# Patient Record
Sex: FEMALE
[De-identification: names, ages, dates, MRNs, and addresses within clinical notes are randomized per-mention and may not be internally consistent; named-entity substitution may affect disease eponyms.]

---

## 2019-09-08 ENCOUNTER — HOSPITAL ENCOUNTER (EMERGENCY)
Dept: HOSPITAL 5 - ED | Age: 69
Discharge: HOME | End: 2019-09-08
Payer: MEDICARE

## 2019-09-08 DIAGNOSIS — I25.2: ICD-10-CM

## 2019-09-08 DIAGNOSIS — Z86.73: ICD-10-CM

## 2019-09-08 DIAGNOSIS — Z90.49: ICD-10-CM

## 2019-09-08 DIAGNOSIS — Z98.890: ICD-10-CM

## 2019-09-08 DIAGNOSIS — G89.29: Primary | ICD-10-CM

## 2019-09-08 DIAGNOSIS — J43.9: ICD-10-CM

## 2019-09-08 DIAGNOSIS — M19.90: ICD-10-CM

## 2019-09-08 DIAGNOSIS — M25.562: ICD-10-CM

## 2019-09-08 DIAGNOSIS — Z95.1: ICD-10-CM

## 2019-09-08 DIAGNOSIS — I25.10: ICD-10-CM

## 2019-09-08 DIAGNOSIS — I10: ICD-10-CM

## 2019-09-08 DIAGNOSIS — M25.561: ICD-10-CM

## 2019-09-08 DIAGNOSIS — Z87.891: ICD-10-CM

## 2019-09-08 DIAGNOSIS — Z79.899: ICD-10-CM

## 2019-09-08 PROCEDURE — 99283 EMERGENCY DEPT VISIT LOW MDM: CPT

## 2019-09-08 NOTE — EMERGENCY DEPARTMENT REPORT
ED Extremity Problem HPI





- General


Chief complaint: Extremity Problem,Nontraumatic


Stated complaint: GENERAL WEAKNESS


Time Seen by Provider: 09/08/19 17:28


Source: patient, EMS


Mode of arrival: Stretcher


Limitations: Physical Limitation





- History of Present Illness


Initial comments: 





69-year-old  female presents to the emergency room complaining of 

bilateral knee pain.  Patient reports that she has an extensive history of CVA, 

CAD osteoarthritis multiple falls.  Patient has not taken anything for pain 

management.  Patient reports that she is aware that she needs surgery on her 

knee but her surgeon does not feel that she is strong enough to have surgery.  

Patient denies any fever chills no falls within the last week. 


MD Complaint: extremity pain


-: year(s)


Location: bilateral lower extremity (knee)


History of Same: Yes


Quality: aching


Consistency: constant


Improves with: nothing


Worsens with: weight bearing, walking





- Related Data


                                  Previous Rx's











 Medication  Instructions  Recorded  Last Taken  Type


 


guaiFENesin DM [Robitussin Dm] 10 ml PO Q4H PRN #1 bottle 08/29/16 Unknown Rx


 


ALPRAZolam [Xanax TAB] 0.25 mg PO Q8H PRN #30 tablet 12/05/16 Unknown Rx


 


AtorvaSTATin [Lipitor] 20 mg PO HS #30 tablet 12/05/16 Unknown Rx


 


Clopidogrel [Plavix] 75 mg PO QDAY #30 tablet 12/05/16 Unknown Rx


 


Famotidine [Pepcid] 20 mg PO BID #60 tablet 12/05/16 Unknown Rx


 


HYDROcodone/APAP 7.5-325 [Norco 1 each PO Q6H PRN #30 tablet 12/05/16 Unknown Rx





7.5-325 mg TAB]    


 


Ipratropium/Albuter (Nf) 2 puff IH QID #1 inha 12/05/16 Unknown Rx





[Combivent Inhaler]    


 


Ipratropium/Albuterol Sulfate 1 ampul IH Q6HRT #120 ampul.neb 12/05/16 Unknown 

Rx





[DUONEB *Not for PRN Use*]    


 


Lidocaine [Lidoderm Patch] 1 each TP QDAY #30 adh..patch 12/05/16 Unknown Rx


 


Metoprolol [Lopressor TAB] 50 mg PO BID #60 tablet 12/05/16 Unknown Rx


 


rOPINIRole [Requip] 0.25 mg PO QPM #30 tablet 12/05/16 Unknown Rx


 


traMADol [Ultram 50 MG tab] 50 mg PO Q6HR PRN #12 tablet 09/08/19 Unknown Rx











                                    Allergies











Allergy/AdvReac Type Severity Reaction Status Date / Time


 


No Known Allergies Allergy   Verified 09/08/19 17:27














ED Review of Systems


ROS: 


Stated complaint: GENERAL WEAKNESS


Other details as noted in HPI





Comment: All other systems reviewed and negative


Constitutional: denies: chills, fever


Eyes: denies: eye pain, eye discharge, vision change


ENT: denies: ear pain, throat pain


Respiratory: denies: cough, shortness of breath, wheezing


Musculoskeletal: joint swelling, arthralgia





ED Past Medical Hx





- Past Medical History


Previous Medical History?: Yes


Hx Hypertension: Yes


Hx CVA: Yes (L sided paralysis, residual)


Hx Heart Attack/AMI: Yes


Hx Arthritis: Yes


Hx COPD: Yes


Additional medical history: EMPHESYMA.  CAD.  Multiple falls





- Surgical History


Past Surgical History?: Yes


Hx Coronary Stent: Yes (X 5)


Hx Pacemaker: No


Hx Cholecystectomy: Yes





- Social History


Smoking Status: Former Smoker


Substance Use Type: None





- Medications


Home Medications: 


                                Home Medications











 Medication  Instructions  Recorded  Confirmed  Last Taken  Type


 


guaiFENesin DM [Robitussin Dm] 10 ml PO Q4H PRN #1 bottle 08/29/16 11/19/16 

Unknown Rx


 


ALPRAZolam [Xanax TAB] 0.25 mg PO Q8H PRN #30 tablet 12/05/16  Unknown Rx


 


AtorvaSTATin [Lipitor] 20 mg PO HS #30 tablet 12/05/16  Unknown Rx


 


Clopidogrel [Plavix] 75 mg PO QDAY #30 tablet 12/05/16  Unknown Rx


 


Famotidine [Pepcid] 20 mg PO BID #60 tablet 12/05/16  Unknown Rx


 


HYDROcodone/APAP 7.5-325 [Norco 1 each PO Q6H PRN #30 tablet 12/05/16  Unknown 

Rx





7.5-325 mg TAB]     


 


Ipratropium/Albuter (Nf) 2 puff IH QID #1 inha 12/05/16  Unknown Rx





[Combivent Inhaler]     


 


Ipratropium/Albuterol Sulfate 1 ampul IH Q6HRT #120 ampul.neb 12/05/16  Unknown 

Rx





[DUONEB *Not for PRN Use*]     


 


Lidocaine [Lidoderm Patch] 1 each TP QDAY #30 adh..patch 12/05/16  Unknown Rx


 


Metoprolol [Lopressor TAB] 50 mg PO BID #60 tablet 12/05/16  Unknown Rx


 


rOPINIRole [Requip] 0.25 mg PO QPM #30 tablet 12/05/16  Unknown Rx


 


traMADol [Ultram 50 MG tab] 50 mg PO Q6HR PRN #12 tablet 09/08/19  Unknown Rx














ED Physical Exam





- General


Limitations: Physical Limitation


General appearance: alert, in no apparent distress





- Head


Head exam: Present: atraumatic, normocephalic





- Eye


Eye exam: Present: normal appearance





- ENT


ENT exam: Present: mucous membranes moist





- Respiratory


Respiratory exam: Present: normal lung sounds bilaterally.  Absent: respiratory 

distress





- Cardiovascular


Cardiovascular Exam: Present: regular rate, normal rhythm.  Absent: systolic 

murmur, diastolic murmur, rubs, gallop





- GI/Abdominal


GI/Abdominal exam: Present: soft, normal bowel sounds.  Absent: distended, 

tenderness





- Expanded Lower Extremity Exam


  ** Left


Upper Leg exam: Present: normal inspection, full ROM


Knee exam: Present: tenderness, swelling


Neuro vascular tendon exam: Present: no vascular compromise





  ** Right


Hip exam: Present: normal inspection, full ROM


Upper Leg exam: Present: normal inspection.  Absent: full ROM, tenderness


Knee exam: Present: full ROM.  Absent: tenderness, swelling, abrasion, 

laceration, ecchymosis, deformity, crepidus, dislocation, erythema, effusion


Lower Leg exam: Present: full ROM.  Absent: tenderness, swelling


Ankle exam: Present: normal inspection, full ROM.  Absent: tenderness, swelling





- Neurological Exam


Neurological exam: Present: alert, oriented X3





- Psychiatric


Psychiatric exam: Present: depressed





- Skin


Skin exam: Present: warm, dry, intact, normal color.  Absent: rash





ED Course


                                   Vital Signs











  09/08/19





  17:20


 


Temperature 98.3 F


 


Pulse Rate 78


 


Respiratory 16





Rate 


 


Blood Pressure 166/82


 


O2 Sat by Pulse 95





Oximetry 














ED Medical Decision Making





- Medical Decision Making





69-year-old  female presents to the emergency room complaining of 

bilateral knee pain.  Patient reports that she has an extensive history of CVA, 

CAD osteoarthritis multiple falls.  Patient has not taken anything for pain 

management.  Patient reports that she is aware that she needs surgery on her 

knee but her surgeon does not feel that she is strong enough to have surgery.  

Patient denies any fever chills no falls within the last week. 





Patient will given tramadol  for pain management. 


Critical care attestation.: 


If time is entered above; I have spent that time in minutes in the direct care 

of this critically ill patient, excluding procedure time.








ED Disposition


Clinical Impression: 


Chronic knee pain


Qualifiers:


 Laterality: bilateral Qualified Code(s): M25.561 - Pain in right knee; M25.562 

- Pain in left knee; G89.29 - Other chronic pain





Disposition: DC-01 TO HOME OR SELFCARE


Is pt being admited?: No


Does the pt Need Aspirin: No


Condition: Stable


Instructions:  Arthralgia (ED)


Additional Instructions: 


Take pain medication as needed.  Please follow-up with her primary care provider

to discuss pain management.


Prescriptions: 


traMADol [Ultram 50 MG tab] 50 mg PO Q6HR PRN #12 tablet


 PRN Reason: Pain


Referrals: 


Your,Provider [Other] - 3-5 Days

## 2019-09-09 VITALS — SYSTOLIC BLOOD PRESSURE: 159 MMHG | DIASTOLIC BLOOD PRESSURE: 73 MMHG

## 2020-05-25 ENCOUNTER — HOSPITAL ENCOUNTER (EMERGENCY)
Dept: HOSPITAL 5 - ED | Age: 70
LOS: 1 days | Discharge: LEFT BEFORE DISCHARGE | End: 2020-05-26
Payer: MEDICARE

## 2020-05-25 DIAGNOSIS — Z95.818: ICD-10-CM

## 2020-05-25 DIAGNOSIS — J44.9: ICD-10-CM

## 2020-05-25 DIAGNOSIS — G89.29: Primary | ICD-10-CM

## 2020-05-25 DIAGNOSIS — Z76.0: ICD-10-CM

## 2020-05-25 DIAGNOSIS — Z87.891: ICD-10-CM

## 2020-05-25 DIAGNOSIS — Z90.49: ICD-10-CM

## 2020-05-25 DIAGNOSIS — I10: ICD-10-CM

## 2020-05-25 DIAGNOSIS — I25.2: ICD-10-CM

## 2020-05-25 DIAGNOSIS — M13.88: ICD-10-CM

## 2020-05-25 DIAGNOSIS — Z86.73: ICD-10-CM

## 2020-05-25 NOTE — EMERGENCY DEPARTMENT REPORT
ED General Adult HPI





- General


Chief complaint: Pain General


Stated complaint: FEET ITCHING/BURNING


Time Seen by Provider: 05/25/20 22:31


Source: patient, EMS


Mode of arrival: Stretcher


Limitations: Physical Limitation





- History of Present Illness


Initial comments: 





70-year-old female, history of COPD, CAD, CVA, presents to ED requesting 

medication refill.  Patient reports chronic pain to bilateral feet and legs.  

Patient reports she has had swelling to her left leg since her CVA 5 years ago 

which affected the left side.  Patient reports she is had bilateral lower leg 

pain for several years and is in need of a bilateral knee replacement.  Patient 

reports she does have bilateral foot pain x1 year with red spots on her feet 

that come and go over the past year.  Also reports of fungus on the left lateral

foot that has been present x1 year.  Patient states he "never went away"despite 

using creams and oral antibiotics.  Patient reports she is on Percocet for her 

chronic pain, which she ran out of 2 days ago.  Patient is requesting a refill 

on her Percocet and "something to help me sleep because they say I have 

anxiety."


-: year(s) (1)


Location: left, right, lower extremity


Quality: burning


Consistency: constant


Improves with: medication


Worsens with: none


Associated Symptoms: denies other symptoms





- Related Data


                                  Previous Rx's











 Medication  Instructions  Recorded  Last Taken  Type


 


guaiFENesin DM [Robitussin Dm] 10 ml PO Q4H PRN #1 bottle 08/29/16 Unknown Rx


 


ALPRAZolam [Xanax TAB] 0.25 mg PO Q8H PRN #30 tablet 12/05/16 Unknown Rx


 


AtorvaSTATin [Lipitor] 20 mg PO HS #30 tablet 12/05/16 Unknown Rx


 


Clopidogrel [Plavix] 75 mg PO QDAY #30 tablet 12/05/16 Unknown Rx


 


Famotidine [Pepcid] 20 mg PO BID #60 tablet 12/05/16 Unknown Rx


 


HYDROcodone/APAP 7.5-325 [Norco 1 each PO Q6H PRN #30 tablet 12/05/16 Unknown Rx





7.5-325 mg TAB]    


 


Ipratropium/Albuter (Nf) 2 puff IH QID #1 inha 12/05/16 Unknown Rx





[Combivent Inhaler]    


 


Ipratropium/Albuterol Sulfate 1 ampul IH Q6HRT #120 ampul.neb 12/05/16 Unknown 

Rx





[DUONEB *Not for PRN Use*]    


 


Lidocaine [Lidoderm Patch] 1 each TP QDAY #30 adh..patch 12/05/16 Unknown Rx


 


Metoprolol [Lopressor TAB] 50 mg PO BID #60 tablet 12/05/16 Unknown Rx


 


rOPINIRole [Requip] 0.25 mg PO QPM #30 tablet 12/05/16 Unknown Rx


 


traMADoL [Ultram 50 MG tab] 50 mg PO Q6HR PRN #12 tablet 09/08/19 Unknown Rx











                                    Allergies











Allergy/AdvReac Type Severity Reaction Status Date / Time


 


No Known Allergies Allergy   Verified 09/08/19 17:27














ED Review of Systems


ROS: 


Stated complaint: FEET ITCHING/BURNING


Other details as noted in HPI





Comment: All other systems reviewed and negative


Musculoskeletal: as per HPI





ED Past Medical Hx





- Past Medical History


Hx Hypertension: Yes


Hx CVA: Yes (L sided paralysis, residual)


Hx Heart Attack/AMI: Yes


Hx Arthritis: Yes


Hx COPD: Yes


Additional medical history: EMPHESYMA.  CAD.  Multiple falls





- Surgical History


Hx Coronary Stent: Yes (X 5)


Hx Pacemaker: No


Hx Cholecystectomy: Yes





- Social History


Smoking Status: Former Smoker


Substance Use Type: None





- Medications


Home Medications: 


                                Home Medications











 Medication  Instructions  Recorded  Confirmed  Last Taken  Type


 


guaiFENesin DM [Robitussin Dm] 10 ml PO Q4H PRN #1 bottle 08/29/16 11/19/16 

Unknown Rx


 


ALPRAZolam [Xanax TAB] 0.25 mg PO Q8H PRN #30 tablet 12/05/16  Unknown Rx


 


AtorvaSTATin [Lipitor] 20 mg PO HS #30 tablet 12/05/16  Unknown Rx


 


Clopidogrel [Plavix] 75 mg PO QDAY #30 tablet 12/05/16  Unknown Rx


 


Famotidine [Pepcid] 20 mg PO BID #60 tablet 12/05/16  Unknown Rx


 


HYDROcodone/APAP 7.5-325 [Norco 1 each PO Q6H PRN #30 tablet 12/05/16  Unknown 

Rx





7.5-325 mg TAB]     


 


Ipratropium/Albuter (Nf) 2 puff IH QID #1 inha 12/05/16  Unknown Rx





[Combivent Inhaler]     


 


Ipratropium/Albuterol Sulfate 1 ampul IH Q6HRT #120 ampul.neb 12/05/16  Unknown 

Rx





[DUONEB *Not for PRN Use*]     


 


Lidocaine [Lidoderm Patch] 1 each TP QDAY #30 adh..patch 12/05/16  Unknown Rx


 


Metoprolol [Lopressor TAB] 50 mg PO BID #60 tablet 12/05/16  Unknown Rx


 


rOPINIRole [Requip] 0.25 mg PO QPM #30 tablet 12/05/16  Unknown Rx


 


traMADoL [Ultram 50 MG tab] 50 mg PO Q6HR PRN #12 tablet 09/08/19  Unknown Rx














ED Physical Exam





- General


Limitations: Physical Limitation


General appearance: alert, in no apparent distress





- Head


Head exam: Present: atraumatic, normocephalic





- Eye


Eye exam: Present: normal appearance, EOMI





- ENT


ENT exam: Present: mucous membranes moist





- Neck


Neck exam: Present: normal inspection





- Respiratory


Respiratory exam: Present: normal lung sounds bilaterally.  Absent: respiratory 

distress





- Cardiovascular


Cardiovascular Exam: Present: regular rate, normal rhythm





- GI/Abdominal


GI/Abdominal exam: Absent: distended





- Extremities Exam


Extremities exam: Present: other (mild swelling present in left foot; dry, 

scaly, fungal appearance of left lateral foot; no erythema present; sensation 

intact, DP pulses intact, feet are warm bilaterally, cap refill normal)





- Neurological Exam


Neurological exam: Present: alert, oriented X3





- Psychiatric


Psychiatric exam: Present: normal affect, normal mood





- Skin


Skin exam: Present: warm, dry, intact





ED Course


                                   Vital Signs











  05/25/20





  22:29


 


Temperature 98.0 F


 


Pulse Rate 80


 


Respiratory 18





Rate 


 


Blood Pressure 176/72


 


O2 Sat by Pulse 98





Oximetry 














ED Medical Decision Making





- Medical Decision Making





Pt has no emergent medical condition at this time.  Condition is ongoing for at 

least 1 year now.  She is advised to follow up with her PCP.





- Differential Diagnosis


chronic pain


Critical care attestation.: 


If time is entered above; I have spent that time in minutes in the direct care 

of this critically ill patient, excluding procedure time.








ED Disposition


Clinical Impression: 


 Chronic pain, Medication refill





Disposition: Z-07 MED SCREENING EXAM-LEFT


Is pt being admited?: No


Condition: Stable


Instructions:  Chronic Pain (ED)


Referrals: 


PRIMARY CARE,MD [Primary Care Provider] - 3-5 Days


Time of Disposition: 22:44

## 2020-05-26 VITALS — SYSTOLIC BLOOD PRESSURE: 166 MMHG | DIASTOLIC BLOOD PRESSURE: 79 MMHG
